# Patient Record
Sex: FEMALE | Race: BLACK OR AFRICAN AMERICAN | NOT HISPANIC OR LATINO | URBAN - METROPOLITAN AREA
[De-identification: names, ages, dates, MRNs, and addresses within clinical notes are randomized per-mention and may not be internally consistent; named-entity substitution may affect disease eponyms.]

---

## 2017-01-30 ENCOUNTER — EMERGENCY (EMERGENCY)
Facility: HOSPITAL | Age: 32
LOS: 1 days | Discharge: HOME | End: 2017-01-30
Attending: EMERGENCY MEDICINE | Admitting: EMERGENCY MEDICINE
Payer: MEDICAID

## 2017-01-30 VITALS
DIASTOLIC BLOOD PRESSURE: 76 MMHG | WEIGHT: 149.91 LBS | HEIGHT: 65 IN | SYSTOLIC BLOOD PRESSURE: 127 MMHG | HEART RATE: 93 BPM | RESPIRATION RATE: 16 BRPM | TEMPERATURE: 97 F | OXYGEN SATURATION: 100 %

## 2017-01-30 DIAGNOSIS — F41.8 OTHER SPECIFIED ANXIETY DISORDERS: ICD-10-CM

## 2017-01-30 DIAGNOSIS — F41.9 ANXIETY DISORDER, UNSPECIFIED: ICD-10-CM

## 2017-01-30 DIAGNOSIS — Z59.0 HOMELESSNESS: ICD-10-CM

## 2017-01-30 DIAGNOSIS — Z79.899 OTHER LONG TERM (CURRENT) DRUG THERAPY: ICD-10-CM

## 2017-01-30 PROCEDURE — 99285 EMERGENCY DEPT VISIT HI MDM: CPT

## 2017-01-30 PROCEDURE — 70450 CT HEAD/BRAIN W/O DYE: CPT | Mod: 26

## 2017-01-30 RX ORDER — ACETAMINOPHEN 500 MG
650 TABLET ORAL ONCE
Qty: 0 | Refills: 0 | Status: COMPLETED | OUTPATIENT
Start: 2017-01-30 | End: 2017-01-30

## 2017-01-30 RX ADMIN — Medication 650 MILLIGRAM(S): at 10:58

## 2017-01-30 SDOH — ECONOMIC STABILITY - HOUSING INSECURITY: HOMELESSNESS: Z59.0

## 2017-01-30 NOTE — ED ADULT NURSE REASSESSMENT NOTE - NS ED NURSE REASSESS COMMENT FT1
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

## 2017-01-30 NOTE — ED ADULT NURSE NOTE - CHPI ED SYMPTOMS NEG
no confusion/no syncope/no nausea/no weakness/no dizziness/no blurred vision/no vomiting/no change in level of consciousness

## 2017-01-30 NOTE — ED PROVIDER NOTE - PROGRESS NOTE DETAILS
paged social work Pt is stressed and tired of waiting for sw and psych.  Wants to leave now.  She is AAOx3 and continues to deny SI, HI or hallucinations. SW here to see pt in ED.  Pt amenable to see SW. Pt eloped. paged social work ( Scribe: Yesy Pastrana, scribing for and in the presence of Dr. Garcia )

## 2017-01-30 NOTE — ED PROVIDER NOTE - MEDICAL DECISION MAKING DETAILS
32yo F overwhelmed and feels stressed and depressed.  She is a single mom living in a shelter.  Pt hit herself with a hairdryer.  CT head, SW, psych and reassess.

## 2017-01-30 NOTE — ED PROVIDER NOTE - OBJECTIVE STATEMENT
32 y/o F presents to ED for evaluation of head injury. Pt states she hit herself in the head with a blow dryer yesterday afternoon because she is exhausted and tired. Pt states she feels like she has depression and would like to get help. Pt is a single mom and homeless, living in a shelter with a 10 year old daughter and 17 month old son.  Feels suicidal because of her living situation. Denies fever, NVD, LOC, or other injuries. JOSELIN- Carlos. 32 y/o F presents to ED for evaluation of head injury. Pt states she hit herself in the head with a blow dryer yesterday afternoon because she is exhausted and tired. Pt states she feels like she has depression and would like to get help. Pt is a single mom and homeless, living in a shelter with a 10 year old daughter and 17 month old son.   Support system includes her older brother and mother, though she does not always ask her for help.   Denies fever, NVD, LOC, or other injuries. PMD- Carlos. 32 y/o F presents to ED for evaluation of head injury. Pt states she hit herself in the head with a blow dryer yesterday afternoon because she is exhausted and tired. Pt states she feels like she has depression and would like to get help. Pt is a single mom and homeless, living in a shelter with a 10 year old daughter and 17 month old son.   Support system includes her older brother and mother, though she does not always ask them for help.   Denies fever, NVD, neck pain, LOC, or other injuries. PMANNALEE- Carlos. 32 y/o F presents to ED for evaluation of head injury. Pt states she hit herself in the head with a blow dryer yesterday afternoon because she is exhausted and tired. She  is a single mom and homeless, living in a shelter with a 10 year old daughter and 17 month old son.   Support system includes her older brother and mother, though she does not always ask them for help.  Pt states she feels like she has depression and would like to get help.  Denies fever, NVD, neck pain, LOC, or other injuries. PMD- Carlos. 32 y/o F presents to ED for evaluation of head injury. Pt states she hit herself in the head with a blow dryer yesterday afternoon because she is exhausted and tired. She  is a single mom and homeless, living in a shelter with a 10 year old daughter and 17 month old son.   Support system includes her older brother and mother, though she does not always ask them for help.  Pt states she feels like she has depression and would like to get help.  Denies fever, NVD, neck pain, LOC, or other injuries. Pt states she feels safe at home, she is in a homeless shelter.  She denies SI, HI, hallucinations.  Pt is overwhelmed being a single mom.  She states she gets free  for her 17mo old but in turn has to work for free so she is unable to obtain a job.  States mom is very condescending.  PMD- Carlos.

## 2017-01-30 NOTE — ED PROVIDER NOTE - CHPI ED SYMPTOMS NEG
no vomiting/no change in level of consciousness/no chest wall tenderness/no abrasion/no chest pain/no loss of consciousness/no back pain/no blurred vision/no weakness/no seizure

## 2017-01-30 NOTE — ED PROVIDER NOTE - ENMT, MLM
Airway patent, Nasal mucosa clear. Mouth with normal mucosa. Throat has no vesicles, no oropharyngeal exudates and uvula is midline. No c spine tenderness.

## 2019-08-01 ENCOUNTER — OUTPATIENT (OUTPATIENT)
Dept: OUTPATIENT SERVICES | Facility: HOSPITAL | Age: 34
LOS: 1 days | End: 2019-08-01
Payer: MEDICAID

## 2019-08-21 DIAGNOSIS — Z71.89 OTHER SPECIFIED COUNSELING: ICD-10-CM

## 2019-12-01 ENCOUNTER — OUTPATIENT (OUTPATIENT)
Dept: OUTPATIENT SERVICES | Facility: HOSPITAL | Age: 34
LOS: 1 days | End: 2019-12-01
Payer: MEDICAID

## 2019-12-01 PROCEDURE — G9005: CPT

## 2019-12-01 PROCEDURE — G9001: CPT

## 2020-01-01 ENCOUNTER — OUTPATIENT (OUTPATIENT)
Dept: OUTPATIENT SERVICES | Facility: HOSPITAL | Age: 35
LOS: 1 days | End: 2020-01-01
Payer: MEDICAID

## 2020-01-01 ENCOUNTER — EMERGENCY (EMERGENCY)
Facility: HOSPITAL | Age: 35
LOS: 1 days | Discharge: HOME | End: 2020-01-01
Attending: EMERGENCY MEDICINE | Admitting: EMERGENCY MEDICINE
Payer: MEDICAID

## 2020-01-01 VITALS
SYSTOLIC BLOOD PRESSURE: 124 MMHG | DIASTOLIC BLOOD PRESSURE: 78 MMHG | RESPIRATION RATE: 20 BRPM | TEMPERATURE: 98 F | HEART RATE: 90 BPM | OXYGEN SATURATION: 97 %

## 2020-01-01 VITALS
WEIGHT: 134.92 LBS | DIASTOLIC BLOOD PRESSURE: 77 MMHG | TEMPERATURE: 98 F | OXYGEN SATURATION: 99 % | RESPIRATION RATE: 18 BRPM | SYSTOLIC BLOOD PRESSURE: 119 MMHG | HEIGHT: 64 IN | HEART RATE: 87 BPM

## 2020-01-01 PROCEDURE — 99283 EMERGENCY DEPT VISIT LOW MDM: CPT

## 2020-01-01 NOTE — ED ADULT NURSE NOTE - NSIMPLEMENTINTERV_GEN_ALL_ED
Implemented All Universal Safety Interventions:  [place] to call system. Call bell, personal items and telephone within reach. Instruct patient to call for assistance. Room bathroom lighting operational. Non-slip footwear when patient is off stretcher. Physically safe environment: no spills, clutter or unnecessary equipment. Stretcher in lowest position, wheels locked, appropriate side rails in place.

## 2020-01-01 NOTE — ED PROVIDER NOTE - CHPI ED SYMPTOMS NEG
no disorientation/no hallucinations/no homicidal/no suicidal/no change in level of consciousness/no paranoia

## 2020-01-01 NOTE — ED PROVIDER NOTE - OBJECTIVE STATEMENT
Patient brought her son in for an asthma attack. Stated she wanted to register as a patient herself as she is very stressed, exhausted, and sleep deprived. States she has not slept in dyas, as she is taking care of her kids alone. Gets occasional help [place] but too far to provide regular help.    Patient denies S/H ideation. Does not know what she wants as a patient, but states she needs sleep, and right now needs "10 minutes to go smoke a cigarette."    Patient does not want to speak with a psychiatrist, though keeps stating that she's "going to have a nervous breakdown if she doesn't get some sleep." Offered patient to speak with SW to see if they can arrange help with the children to give patient a break and chance to rest. Patient just saying she needs a cigarette at this point

## 2020-01-01 NOTE — ED PROVIDER NOTE - CLINICAL SUMMARY MEDICAL DECISION MAKING FREE TEXT BOX
Patient with severe stress at home, not sleeping. Offered psych and SW. Will allow patient to go have a cigarette as she requests, then reassess patient's wants/ needs

## 2020-01-01 NOTE — ED PROVIDER NOTE - PATIENT PORTAL LINK FT
You can access the FollowMyHealth Patient Portal offered by [facility] by registering at the following website: [email or web address]. By joining [employer]’s FollowMyHealth portal, you will also be able to view your health information using other applications (apps) compatible with our system.

## 2020-01-01 NOTE — ED ADULT NURSE NOTE - OBJECTIVE STATEMENT
33yo female indicates "I just need some time to relax, I havent slept in four days" as per pt. pt denies any pain. pt is here with her son who is a patient.

## 2020-01-23 DIAGNOSIS — Z71.89 OTHER SPECIFIED COUNSELING: ICD-10-CM

## 2020-02-01 ENCOUNTER — OUTPATIENT (OUTPATIENT)
Dept: OUTPATIENT SERVICES | Facility: HOSPITAL | Age: 35
LOS: 1 days | End: 2020-02-01
Payer: MEDICAID

## 2020-02-01 PROCEDURE — G9005: CPT

## 2020-08-10 DIAGNOSIS — Z71.89 OTHER SPECIFIED COUNSELING: ICD-10-CM

## 2020-08-25 DIAGNOSIS — Z71.89 OTHER SPECIFIED COUNSELING: ICD-10-CM

## 2021-05-18 DIAGNOSIS — Z71.89 OTHER SPECIFIED COUNSELING: ICD-10-CM

## 2021-07-09 ENCOUNTER — EMERGENCY (EMERGENCY)
Facility: HOSPITAL | Age: 36
LOS: 1 days | Discharge: HOME | End: 2021-07-09
Attending: EMERGENCY MEDICINE | Admitting: EMERGENCY MEDICINE
Payer: MEDICAID

## 2021-07-09 VITALS
RESPIRATION RATE: 16 BRPM | HEART RATE: 86 BPM | TEMPERATURE: 99 F | SYSTOLIC BLOOD PRESSURE: 118 MMHG | DIASTOLIC BLOOD PRESSURE: 70 MMHG | OXYGEN SATURATION: 99 %

## 2021-07-09 VITALS
DIASTOLIC BLOOD PRESSURE: 66 MMHG | OXYGEN SATURATION: 99 % | HEIGHT: 64 IN | HEART RATE: 100 BPM | SYSTOLIC BLOOD PRESSURE: 124 MMHG | RESPIRATION RATE: 16 BRPM | TEMPERATURE: 99 F | WEIGHT: 145.06 LBS

## 2021-07-09 PROCEDURE — 12042 INTMD RPR N-HF/GENIT2.6-7.5: CPT

## 2021-07-09 PROCEDURE — 99284 EMERGENCY DEPT VISIT MOD MDM: CPT | Mod: 25

## 2021-07-09 PROCEDURE — 99284 EMERGENCY DEPT VISIT MOD MDM: CPT

## 2021-07-09 RX ORDER — OXYCODONE AND ACETAMINOPHEN 5; 325 MG/1; MG/1
1 TABLET ORAL ONCE
Refills: 0 | Status: DISCONTINUED | OUTPATIENT
Start: 2021-07-09 | End: 2021-07-09

## 2021-07-09 RX ADMIN — OXYCODONE AND ACETAMINOPHEN 1 TABLET(S): 5; 325 TABLET ORAL at 18:03

## 2021-07-09 NOTE — ED PROVIDER NOTE - OBJECTIVE STATEMENT
37 y/o F with no PMH presents to ED for c/o laceration to vagina. States got lac during Brazilian wax at 3:30pm. States hard wax was used. Denies any other pain or injury. Tried to stop bleeding with  gauze but continues to bleed. Not on blood thinners/

## 2021-07-09 NOTE — PROCEDURE NOTE - ADDITIONAL PROCEDURE DETAILS
Emergent procedure as pt was actively bleeding in ER.   Verbal consent for laceration repair given  5x3cm right labia minora laceration along skin fold.  Area cleansed with copious amount of saline and betadine  1% lidocaine with epi infiltrated into wound  Multiple deep and superficial sutures of 3-0 vicryl used to reapproximate wound  Good reapproximation and hemostasis  Remainder of vulva explored, no other lacerations noted

## 2021-07-09 NOTE — ED ADULT NURSE NOTE - OBJECTIVE STATEMENT
Amb to ED. Pt had a Brazilian wax this afternoon & she states it was no done correctly- "they pulled off the skin in my private area & it's painful & bleeding" O/E laceration noted to rt labia- bleeding controlled

## 2021-07-09 NOTE — ED PROVIDER NOTE - PROGRESS NOTE DETAILS
Call placed to GYN Dr Christie, will be in shortly to suture. Pt tolerated lac repair well by GYN. All discharge instructions were reviewed. Pt indicate understanding. Advised close follow up.

## 2021-07-09 NOTE — ED PROVIDER NOTE - ATTENDING CONTRIBUTION TO CARE
37 yo F p/w was getting a brazilian wax today using hard wax and some wax got into R side labia. When provider ripped the wax, it caused laceration to R labia. Pt then left and came right to ed. Pt co some persistent bleeding. No other inj or co.  Exam: MM Moist. neck supple. non-toxic, well appearing.   Exam chap by NP Knuppel - R inner labia with 1.5 cm laceration with some separation. No other acute findings.  GYN consulted for repair / eval.

## 2021-07-09 NOTE — ED ADULT NURSE NOTE - NSIMPLEMENTINTERV_GEN_ALL_ED
Implemented All Universal Safety Interventions:  [place] to call system. Call bell, personal items and telephone within reach. Instruct patient to call for assistance. Room bathroom lighting operational. Non-slip footwear when patient is off stretcher. Physically safe environment: no spills, clutter or unnecessary equipment. Stretcher in lowest position, wheels locked, appropriate side rails in place.

## 2021-07-09 NOTE — ED PROVIDER NOTE - NSFOLLOWUPINSTRUCTIONS_ED_ALL_ED_FT
1. TAKE ALL MEDICATIONS AS DIRECTED. REST APPLY ICE AS NEEDED. FOR PAIN YOU CAN TAKE IBUPROFEN (MOTRIN, ADVIL) OR ACETAMINOPHEN (TYLENOL) AS NEEDED, AS DIRECTED ON PACKAGING.  2. FOLLOW UP WITH ___OB/GYN_______ AS DIRECTED.  YOU WERE GIVEN COPIES OF ALL LABS AND IMAGING RESULTS FROM YOUR ER VISIT--PLEASE TAKE THEM WITH YOU TO YOUR APPOINTMENT.  3. IF NEEDED, CALL [phone] TO FIND A PRIMARY CARE PHYSICIAN.  OR CALL [phone] TO MAKE AN APPOINTMENT WITH THE MEDICAL CLINIC.  4. RETURN TO THE ER FOR ANY WORSENING SYMPTOMS.      Laceration    A laceration is a cut that goes through all of the layers of the skin and into the tissue that is right under the skin. Some lacerations heal on their own. Others need to be closed with skin adhesive strips, skin glue, stitches (sutures), or staples. Proper laceration care minimizes the risk of infection and helps the laceration to heal better.  If non-absorbable stitches or staples have been placed, they must be taken out within the time frame instructed by your healthcare provider.    SEEK IMMEDIATE MEDICAL CARE IF YOU HAVE ANY OF THE FOLLOWING SYMPTOMS: swelling around the wound, worsening pain, drainage from the wound, red streaking going away from your wound, inability to move finger or toe near the laceration, or discoloration of skin near the laceration. 1. TAKE ALL MEDICATIONS AS DIRECTED. REST APPLY ICE AS NEEDED. FOR PAIN YOU CAN TAKE IBUPROFEN (MOTRIN, ADVIL) OR ACETAMINOPHEN (TYLENOL) AS NEEDED, AS DIRECTED ON PACKAGING.  2. FOLLOW UP WITH ___OB/GYN_______ AS DIRECTED.  YOU WERE GIVEN COPIES OF ALL LABS AND IMAGING RESULTS FROM YOUR ER VISIT--PLEASE TAKE THEM WITH YOU TO YOUR APPOINTMENT.  3. IF NEEDED, CALL [phone] TO FIND A PRIMARY CARE PHYSICIAN.  OR CALL [phone] TO MAKE AN APPOINTMENT WITH THE MEDICAL CLINIC.  4. RETURN TO THE ER FOR ANY WORSENING SYMPTOMS.      NOTHING IN VAGIN X 4 WEEKS  APPLY ICE PACS AS NEEDED  NO SWIMMING NO BATHS NO TAMPONS NO SEX x 4 WEEKS  IF WORSENING PAIN SWELLING DRAINAGE FEVER CHILLS RETURN TO ED OR SEEK MEDICAL CARE     Laceration    A laceration is a cut that goes through all of the layers of the skin and into the tissue that is right under the skin. Some lacerations heal on their own. Others need to be closed with skin adhesive strips, skin glue, stitches (sutures), or staples. Proper laceration care minimizes the risk of infection and helps the laceration to heal better.  If non-absorbable stitches or staples have been placed, they must be taken out within the time frame instructed by your healthcare provider.    SEEK IMMEDIATE MEDICAL CARE IF YOU HAVE ANY OF THE FOLLOWING SYMPTOMS: swelling around the wound, worsening pain, drainage from the wound, red streaking going away from your wound, inability to move finger or toe near the laceration, or discoloration of skin near the laceration.

## 2021-07-09 NOTE — ED ADULT TRIAGE NOTE - CHIEF COMPLAINT QUOTE
" I went to get Brazilian wax and ripped my skin - I so much pain  " Pt took ibuprofen 1 tablet prior to arrival

## 2021-07-09 NOTE — CONSULT NOTE ADULT - ASSESSMENT
A/P:  37yo P2 with right labia minora laceration s/p traumatic Brazilian wax session  - Reapproximation of right labia minora with multiple deep and superficial sutures of 3-0 vicryl in an interrupted fashion with good reapproximation and hemostasis at end  - Perineal cold packs as needed  - May sparingly use aquaphor to area when using pad/pantiliner  - Spray bottle or pat dry area, do not rub or wipe  - Tylenol/Ibuprofen PRN  - No intercourse/douching/tampons for 4 weeks. Avoid strenuous activity, exercise, heavy lifting for two weeks. You may shower freely, but do not soak in the tub or swim.   - Follow-up with Gyn at [facility] in 1wk

## 2021-07-09 NOTE — CONSULT NOTE ADULT - SUBJECTIVE AND OBJECTIVE BOX
HPI: Pt is a 35yo P2 with PMH of Asthma who presented to ED with c/o vaginal bleeding s/p Brazilian wax at 3:30pm. Pt states that the waxing was abnormally painful and she asked the  to stop but she continued to proceed despite profuse bleeding. Pt discontinued the remainder of the session and drove herself to the ED for evaluation.       MEDICATIONS  (STANDING):    MEDICATIONS  (PRN):      Allergies    No Known Allergies    Intolerances        PAST MEDICAL & SURGICAL HISTORY:  Asthma    No significant past surgical history        OB/GYN HISTORY:  P2 -  x 2  Jaylene in 2021    FAMILY HISTORY:  Denies    SOCIAL HISTORY:  Social Alcohol, Marijuana    REVIEW OF SYSTEMS  As per HPI      Vital Signs Last 24 Hrs  T(C): 37.4 (2021 16:41), Max: 37.4 (2021 16:41)  T(F): 99.4 (2021 16:41), Max: 99.4 (2021 16:41)  HR: 100 (2021 16:41) (100 - 100)  BP: 124/66 (2021 16:41) (124/66 - 124/66)  BP(mean): --  RR: 16 (2021 16:41) (16 - 16)  SpO2: 99% (2021 16:41) (99% - 99%)    Last Menstrual Period      PHYSICAL EXAM:      Constitutional: AAOx3    Genitourinary: 5x3cm laceration along right labia minora crease, actively bleeding. No other vulvar lacerations or defects noted.    LABS:    I&O's Detail    RADIOLOGY & ADDITIONAL STUDIES:

## 2021-07-09 NOTE — ED PROVIDER NOTE - PATIENT PORTAL LINK FT
You can access the FollowMyHealth Patient Portal offered by [facility] by registering at the following website: [email or web address]. By joining [employer]’s FollowMyHealth portal, you will also be able to view your health information using other applications (apps) compatible with our system.

## 2021-12-23 ENCOUNTER — EMERGENCY (EMERGENCY)
Facility: HOSPITAL | Age: 36
LOS: 1 days | Discharge: HOME | End: 2021-12-23
Attending: EMERGENCY MEDICINE | Admitting: EMERGENCY MEDICINE
Payer: MEDICAID

## 2021-12-23 VITALS
HEIGHT: 64 IN | HEART RATE: 98 BPM | RESPIRATION RATE: 16 BRPM | SYSTOLIC BLOOD PRESSURE: 98 MMHG | OXYGEN SATURATION: 100 % | WEIGHT: 134.92 LBS | TEMPERATURE: 99 F | DIASTOLIC BLOOD PRESSURE: 64 MMHG

## 2021-12-23 VITALS
HEART RATE: 84 BPM | RESPIRATION RATE: 16 BRPM | OXYGEN SATURATION: 100 % | DIASTOLIC BLOOD PRESSURE: 75 MMHG | SYSTOLIC BLOOD PRESSURE: 112 MMHG

## 2021-12-23 LAB
ALBUMIN SERPL ELPH-MCNC: 3.6 G/DL — SIGNIFICANT CHANGE UP (ref 3.3–5)
ALP SERPL-CCNC: 40 U/L — SIGNIFICANT CHANGE UP (ref 30–120)
ALT FLD-CCNC: 22 U/L DA — SIGNIFICANT CHANGE UP (ref 10–60)
ANION GAP SERPL CALC-SCNC: 7 MMOL/L — SIGNIFICANT CHANGE UP (ref 5–17)
AST SERPL-CCNC: 15 U/L — SIGNIFICANT CHANGE UP (ref 10–40)
BASOPHILS # BLD AUTO: 0.05 K/UL — SIGNIFICANT CHANGE UP (ref 0–0.2)
BASOPHILS NFR BLD AUTO: 0.7 % — SIGNIFICANT CHANGE UP (ref 0–2)
BILIRUB SERPL-MCNC: 0.5 MG/DL — SIGNIFICANT CHANGE UP (ref 0.2–1.2)
BUN SERPL-MCNC: 18 MG/DL — SIGNIFICANT CHANGE UP (ref 7–23)
CALCIUM SERPL-MCNC: 8.4 MG/DL — SIGNIFICANT CHANGE UP (ref 8.4–10.5)
CHLORIDE SERPL-SCNC: 106 MMOL/L — SIGNIFICANT CHANGE UP (ref 96–108)
CO2 SERPL-SCNC: 25 MMOL/L — SIGNIFICANT CHANGE UP (ref 22–31)
CREAT SERPL-MCNC: 1.03 MG/DL — SIGNIFICANT CHANGE UP (ref 0.5–1.3)
EOSINOPHIL # BLD AUTO: 0.18 K/UL — SIGNIFICANT CHANGE UP (ref 0–0.5)
EOSINOPHIL NFR BLD AUTO: 2.5 % — SIGNIFICANT CHANGE UP (ref 0–6)
GLUCOSE SERPL-MCNC: 80 MG/DL — SIGNIFICANT CHANGE UP (ref 70–99)
HCG UR QL: NEGATIVE — SIGNIFICANT CHANGE UP
HCT VFR BLD CALC: 35.5 % — SIGNIFICANT CHANGE UP (ref 34.5–45)
HGB BLD-MCNC: 11.8 G/DL — SIGNIFICANT CHANGE UP (ref 11.5–15.5)
IMM GRANULOCYTES NFR BLD AUTO: 0.1 % — SIGNIFICANT CHANGE UP (ref 0–1.5)
LYMPHOCYTES # BLD AUTO: 2.77 K/UL — SIGNIFICANT CHANGE UP (ref 1–3.3)
LYMPHOCYTES # BLD AUTO: 37.9 % — SIGNIFICANT CHANGE UP (ref 13–44)
MCHC RBC-ENTMCNC: 31.9 PG — SIGNIFICANT CHANGE UP (ref 27–34)
MCHC RBC-ENTMCNC: 33.2 GM/DL — SIGNIFICANT CHANGE UP (ref 32–36)
MCV RBC AUTO: 95.9 FL — SIGNIFICANT CHANGE UP (ref 80–100)
MONOCYTES # BLD AUTO: 0.78 K/UL — SIGNIFICANT CHANGE UP (ref 0–0.9)
MONOCYTES NFR BLD AUTO: 10.7 % — SIGNIFICANT CHANGE UP (ref 2–14)
NEUTROPHILS # BLD AUTO: 3.52 K/UL — SIGNIFICANT CHANGE UP (ref 1.8–7.4)
NEUTROPHILS NFR BLD AUTO: 48.1 % — SIGNIFICANT CHANGE UP (ref 43–77)
NRBC # BLD: 0 /100 WBCS — SIGNIFICANT CHANGE UP (ref 0–0)
PLATELET # BLD AUTO: 238 K/UL — SIGNIFICANT CHANGE UP (ref 150–400)
POTASSIUM SERPL-MCNC: 4 MMOL/L — SIGNIFICANT CHANGE UP (ref 3.5–5.3)
POTASSIUM SERPL-SCNC: 4 MMOL/L — SIGNIFICANT CHANGE UP (ref 3.5–5.3)
PROT SERPL-MCNC: 6.8 G/DL — SIGNIFICANT CHANGE UP (ref 6–8.3)
RBC # BLD: 3.7 M/UL — LOW (ref 3.8–5.2)
RBC # FLD: 12.6 % — SIGNIFICANT CHANGE UP (ref 10.3–14.5)
SODIUM SERPL-SCNC: 138 MMOL/L — SIGNIFICANT CHANGE UP (ref 135–145)
TROPONIN I, HIGH SENSITIVITY RESULT: 4 NG/L — SIGNIFICANT CHANGE UP
WBC # BLD: 7.31 K/UL — SIGNIFICANT CHANGE UP (ref 3.8–10.5)
WBC # FLD AUTO: 7.31 K/UL — SIGNIFICANT CHANGE UP (ref 3.8–10.5)

## 2021-12-23 PROCEDURE — 93010 ELECTROCARDIOGRAM REPORT: CPT

## 2021-12-23 PROCEDURE — 80053 COMPREHEN METABOLIC PANEL: CPT

## 2021-12-23 PROCEDURE — 85025 COMPLETE CBC W/AUTO DIFF WBC: CPT

## 2021-12-23 PROCEDURE — 84484 ASSAY OF TROPONIN QUANT: CPT

## 2021-12-23 PROCEDURE — 71046 X-RAY EXAM CHEST 2 VIEWS: CPT | Mod: 26

## 2021-12-23 PROCEDURE — 99285 EMERGENCY DEPT VISIT HI MDM: CPT

## 2021-12-23 PROCEDURE — 36415 COLL VENOUS BLD VENIPUNCTURE: CPT

## 2021-12-23 PROCEDURE — 81025 URINE PREGNANCY TEST: CPT

## 2021-12-23 PROCEDURE — 93005 ELECTROCARDIOGRAM TRACING: CPT

## 2021-12-23 PROCEDURE — 71046 X-RAY EXAM CHEST 2 VIEWS: CPT

## 2021-12-23 PROCEDURE — 99283 EMERGENCY DEPT VISIT LOW MDM: CPT | Mod: 25

## 2021-12-23 NOTE — ED PROVIDER NOTE - CLINICAL SUMMARY MEDICAL DECISION MAKING FREE TEXT BOX
37 y/o F with pmhx of asthma presents with c/o palpitations x several months. States that she has had intermittent palpitations for months but more frequently over the past week. States that she notices the palpitations when she is upset with her kids. States that kids are home from school and due to holidays feels increased stress and anxiety and notices that her heart rate goes up when she is mad and upset. States that she occasionally has associated chest discomfort/tightness. Denies any chest pain or palpitations at this time. Denies fever, cough, calf pain/swelling, N/V, numbness, tingling, dizziness, diaphoresis, ocp use, hx of dvt/pe. PE: as above A/P; palpitations, anxiety; will get ekg, cxr, ddimer, troponin, labs

## 2021-12-23 NOTE — ED PROVIDER NOTE - ATTENDING CONTRIBUTION TO CARE
37 yo F p/w has been having some waxing / waning palpitations for past several months. More freq this past week. no cp/sob/palp. ,no cough/ uri. no recent covid exposures. no abd pain. no velez / easy fatigue. no numb/ting/focal weak. no agg/allev factors. no other acute co or changes.  exam: MM Moist. neck supple. no meningeal signs. cta bl, no w/r/r. nl cardiac exam. no mrg. abd soft NT. no hsm .no cvat. no c/c/.e. no calf tend. no other acute findings.  check labs, dimer, ekg, outpt cardio

## 2021-12-23 NOTE — ED PROVIDER NOTE - OBJECTIVE STATEMENT
37 y/o F with no pmhx 37 y/o F with no pmhx presents with c/o palpitations x several months. 37 y/o F with pmhx of asthma presents with c/o palpitations x several months. States that she has had intermittent palpitations for months but more frequently over the past week. States that she notices the palpitations when she is upset with her kids. States that kids are home from school and due to holidays feels increased stress and anxiety and notices that her heart rate goes up when she is mad and upset. States that she occasionally has associated chest discomfort/tightness. Denies any chest pain or palpitations at this time. Denies fever, cough, calf pain/swelling, N/V, numbness, tingling, dizziness, diaphoresis, ocp use, hx of dvt/pe.

## 2021-12-23 NOTE — ED PROVIDER NOTE - CARE PROVIDER_API CALL
Lincoln Eugene (MD)  Cardiovascular Disease; Internal Medicine  [address]  [place] [address]  Phone: [phone]  Fax: [phone]  Follow Up Time: 1-3 Days   Lincoln Eugene)  Cardiovascular Disease; Internal Medicine  [address]  [place] [address]  Phone: [phone]  Fax: [phone]  Follow Up Time: 1-3 Days    YOUR PMD,   Phone: (   )    -  Fax: (   )    -  Follow Up Time: 1-3 Days

## 2021-12-23 NOTE — ED PROVIDER NOTE - PROGRESS NOTE DETAILS
CXR reviewed, no acute findings. Will d/c home and f/u cardiologist. CXR reviewed, no acute findings. Will d/c home and f/u cardiologist and PCP.

## 2021-12-23 NOTE — ED PROVIDER NOTE - MUSCULOSKELETAL, MLM
Spine appears normal, range of motion is not limited, no muscle or joint tenderness, calf B NT without swelling

## 2021-12-23 NOTE — ED ADULT NURSE NOTE - OBJECTIVE STATEMENT
Pt came in for frequent episodes of palpitation , lightheadedness and chest pain x weeks now. Pt was seen and sent from an urgent care last night for further evaluation and management. Pt denies any fever, chills, vomiting , diarrhea slurred speech or facial drooping

## 2021-12-23 NOTE — ED PROVIDER NOTE - PATIENT PORTAL LINK FT
You can access the FollowMyHealth Patient Portal offered by [facility] by registering at the following website: [email or web address]. By joining "[employer]’s FollowMyHealth portal, you will also be able to view your health information using other applications (apps) compatible with our system.

## 2021-12-23 NOTE — ED PROVIDER NOTE - PROVIDER TOKENS
PROVIDER:[TOKEN:[[id number]],FOLLOWUP:[1-3 Days]] PROVIDER:[TOKEN:[[id number]],FOLLOWUP:[1-3 Days]],FREE:[LAST:[YOUR PMD],PHONE:[(   )    -],FAX:[(   )    -],FOLLOWUP:[1-3 Days]]

## 2022-01-13 ENCOUNTER — APPOINTMENT (OUTPATIENT)
Dept: PLASTIC SURGERY | Facility: CLINIC | Age: 37
End: 2022-01-13

## 2022-04-10 RX ORDER — ALBUTEROL 90 UG/1
0 AEROSOL, METERED ORAL
Qty: 0 | Refills: 0 | DISCHARGE

## 2022-06-30 NOTE — ED ADULT TRIAGE NOTE - BSA (M2)
Pt c/o burning rash to BLE since yesterday. No new foods, meds, products. No SOB. No throat tightness. No itching. 1.65

## 2022-10-20 NOTE — ED PROVIDER NOTE - CHIEF COMPLAINT
Message left at 3:20 pm        25year old patient left a message asking for a refill of zofran    Patient has eob visit on 11/7/2022     Patient was called back to confirm information and was advised of prescription printed when she went to the er     Patient found the prescription and will take to the pharmacy to get filled  Patient verbalized understanding
The patient is a 36y Female complaining of palpitations.

## 2022-11-04 NOTE — ED ADULT NURSE NOTE - TEMPLATE
Patient has not voided all night, bladder scanner read 508mL as the highest number. Patient said she felt like she could urinate, bedside RN is giving patient a couple minutes to try and void. Patient voided 200mL into the external cath.     Electronically signed by Nanci Chow RN on 11/4/2022 at 5:55 AM Head Injury

## 2023-06-28 ENCOUNTER — EMERGENCY (EMERGENCY)
Facility: HOSPITAL | Age: 38
LOS: 1 days | Discharge: LEFT BEFORE DISCHARGE | End: 2023-06-28
Admitting: EMERGENCY MEDICINE
Payer: MEDICAID

## 2023-06-28 VITALS
TEMPERATURE: 98 F | SYSTOLIC BLOOD PRESSURE: 116 MMHG | WEIGHT: 154.32 LBS | OXYGEN SATURATION: 100 % | HEIGHT: 64 IN | HEART RATE: 84 BPM | RESPIRATION RATE: 16 BRPM | DIASTOLIC BLOOD PRESSURE: 80 MMHG

## 2023-06-28 VITALS
RESPIRATION RATE: 19 BRPM | SYSTOLIC BLOOD PRESSURE: 121 MMHG | HEART RATE: 88 BPM | DIASTOLIC BLOOD PRESSURE: 70 MMHG | OXYGEN SATURATION: 98 %

## 2023-06-28 PROCEDURE — L9991: CPT

## 2023-06-28 NOTE — ED ADULT TRIAGE NOTE - CHIEF COMPLAINT QUOTE
" I have pain on right side shoulder for 8 months - worse when Im yelling keeps me from sleeping at night "

## 2023-06-28 NOTE — ED ADULT NURSE NOTE - OBJECTIVE STATEMENT
received pt in assigned room a&xo3 pt states she came to the ED  s/p near syncope at home, did not fall or hit her head, c/o chest pain & dizziness, pt also reports RUE pain/weakness, all s/s on going for a year on/off. EKG completed, pt placed on cardiac monitor,  pt requested labs to be drawn, during placement of IVL and lab draw , Pt became irate and began yelling at RN, saying " I don't want to be here" pt explained due to her complaint , labs would need to be drawn. offered care and explained she came to the ED for help, pt offered help, pt continued to yell at RN and other staff and refused to stay in the hospital. Pt ambulated to the bathroom and became aggressive and more uncooperative with all staff,  Code [place] activated for safety,  IVL removed and pt left ED without being seen by MD or completing treatment, left ED ambulating steady in nad

## 2023-06-28 NOTE — ED ADULT NURSE NOTE - NSFALLUNIVINTERV_ED_ALL_ED
Bed/Stretcher in lowest position, wheels locked, appropriate side rails in place/Call bell, personal items and telephone in reach/Instruct patient to call for assistance before getting out of bed/chair/stretcher/Non-slip footwear applied when patient is off stretcher/[place] to call system/Physically safe environment - no spills, clutter or unnecessary equipment/Purposeful proactive rounding/Room/bathroom lighting operational, light cord in reach

## 2023-06-29 PROBLEM — J45.909 UNSPECIFIED ASTHMA, UNCOMPLICATED: Chronic | Status: ACTIVE | Noted: 2020-01-01

## 2023-08-12 ENCOUNTER — NON-APPOINTMENT (OUTPATIENT)
Age: 38
End: 2023-08-12

## 2024-01-17 ENCOUNTER — NON-APPOINTMENT (OUTPATIENT)
Age: 39
End: 2024-01-17

## 2024-03-19 ENCOUNTER — EMERGENCY (EMERGENCY)
Facility: HOSPITAL | Age: 39
LOS: 1 days | Discharge: HOME | End: 2024-03-19
Attending: EMERGENCY MEDICINE | Admitting: EMERGENCY MEDICINE
Payer: MEDICAID

## 2024-03-19 VITALS
OXYGEN SATURATION: 100 % | TEMPERATURE: 98 F | WEIGHT: 154.98 LBS | DIASTOLIC BLOOD PRESSURE: 74 MMHG | HEART RATE: 94 BPM | SYSTOLIC BLOOD PRESSURE: 105 MMHG | RESPIRATION RATE: 16 BRPM | HEIGHT: 64 IN

## 2024-03-19 LAB — HCG UR QL: NEGATIVE — SIGNIFICANT CHANGE UP

## 2024-03-19 PROCEDURE — 73610 X-RAY EXAM OF ANKLE: CPT | Mod: 26,RT

## 2024-03-19 PROCEDURE — 99284 EMERGENCY DEPT VISIT MOD MDM: CPT

## 2024-03-19 PROCEDURE — 73610 X-RAY EXAM OF ANKLE: CPT

## 2024-03-19 PROCEDURE — 81025 URINE PREGNANCY TEST: CPT

## 2024-03-19 PROCEDURE — 73630 X-RAY EXAM OF FOOT: CPT

## 2024-03-19 PROCEDURE — 73630 X-RAY EXAM OF FOOT: CPT | Mod: 26,RT

## 2024-03-19 RX ORDER — IBUPROFEN 200 MG
600 TABLET ORAL ONCE
Refills: 0 | Status: COMPLETED | OUTPATIENT
Start: 2024-03-19 | End: 2024-03-19

## 2024-03-19 RX ADMIN — Medication 600 MILLIGRAM(S): at 15:29

## 2024-03-19 NOTE — ED PROVIDER NOTE - PHYSICAL EXAMINATION
ms right le mild lateral ankle tenderness. midfoot mild tenderness distally nontender from nvi no deformity

## 2024-03-19 NOTE — ED PROVIDER NOTE - PATIENT PORTAL LINK FT
You can access the FollowMyHealth Patient Portal offered by [facility] by registering at the following website: [email or web address]. By joining [employer]’s FollowMyHealth portal, you will also be able to view your health information using other applications (apps) compatible with our system.

## 2024-03-19 NOTE — ED PROVIDER NOTE - OBJECTIVE STATEMENT
Patient is a 39-year-old female with past medical history of asthma presents with right foot pain.  Patient reports she kicked something that she thought was some solid without issue yesterday causing acute onset of right foot and ankle pain.  Patient took over-the-counter medication which provided no relief.  Patient went to urgent care but they did not take her insurance so she came to ED for evaluation.  Patient denies fever numbness tingling. Patient is a 39-year-old female with past medical history of asthma presents with right foot pain.  Patient reports she kicked something solid yesterday causing acute onset of right foot and ankle pain.  Patient took over-the-counter medication which provided no relief.  Patient went to urgent care but they did not take her insurance so she came to ED for evaluation.  Patient denies fever numbness tingling.

## 2024-03-19 NOTE — ED PROVIDER NOTE - NSFOLLOWUPINSTRUCTIONS_ED_ALL_ED_FT
1. FOLLOW UP WITH YOUR PRIMARY DOCTOR IN 24-48 HOURS.   2. FOLLOW UP WITH ALL SPECIALIST DISCUSSED DURING YOUR VISIT.   3. TAKE ALL MEDICATIONS PRESCRIBED IN THE ER IF ANY ARE PRESCRIBED. CONTINUE YOUR HOME MEDICATIONS UNLESS OTHERWISE ADVISED DIFFERENTLY.   4. RETURN FOR WORSENING SYMPTOMS OR CONCERNS INCLUDING BUT NOT LIMITED TO FEVER, CHEST PAIN, OR TROUBLE BREATHING OR ANY OTHER CONCERNS  ibuprofen 600mg every 6 hours with food for pain  ice ankle and foot    Foot Pain  Many things can cause foot pain. Common causes include injuries to the foot. The injuries include sprains or broken bones, or injuries that affect the nerves in the feet. Other causes of foot pain include arthritis, blisters, and bunions.    To know what causes your foot pain, your health care provider will take a detailed history of your symptoms. They will also do a physical exam as well as imaging tests, such as X-ray or MRI.    Follow these instructions at home:  Managing pain, stiffness, and swelling    Bag of ice on a towel on the skin.  If told, put ice on the painful area.  Put ice in a plastic bag.  Place a towel between your skin and the bag.  Leave the ice on for 20 minutes, 2–3 times a day.  If your skin turns bright red, remove the ice right away to prevent skin damage. The risk of damage is higher if you cannot feel pain, heat, or cold.  Activity    Do not stand or walk for long periods.  Do stretches to relieve foot pain and stiffness as told by your provider.  Do not lift anything that is heavier than 10 lb (4.5 kg), or the limit that you are told, until your provider says that it is safe. Lifting a lot of weight can put added pressure on your feet.  Return to your normal activities as told by your provider. Ask your provider what activities are safe for you.  Lifestyle    Wear comfortable, supportive shoes that fit you well. Do not wear high heels.  Keep your feet clean and dry.  General instructions    Take over-the-counter and prescription medicines only as told by your provider.  Rub your foot gently.  Pay attention to any changes in your symptoms. Let your provider know if symptoms become worse.  Keep all follow-up visits. Your provider will want to monitor your progress.  Contact a health care provider if:  Your pain does not get better after a few days of treatment at home.  Your pain gets worse.  You cannot stand on your foot.  Your foot or toes are swollen.  Your foot is numb or tingling.  Get help right away if:  Your foot or toes turn white or blue.  You have warmth and redness along your foot.  This information is not intended to replace advice given to you by your health care provider. Make sure you discuss any questions you have with your health care provider.    Document Revised: 01/11/2024 Document Reviewed: 09/19/2023  [employer] Patient Education © 2024 [employer]  [employer] logo  Terms and Conditions  Privacy Policy  Editorial Policy  All content on this site: Copyright © 2024 [employer], its licensors, and contributors. All rights are reserved, including those for text and data mining, AI training, and similar technologies. For all open access content, the Creative Commons licensing terms apply.  Cookies are used by this site. To decline or learn more, visit our Cookies page.

## 2024-03-19 NOTE — ED ADULT NURSE NOTE - OBJECTIVE STATEMENT
Patient is a 39-year-old female with past medical history of asthma presents with right foot pain.  Patient reports she kicked something solid yesterday causing acute onset of right foot and ankle pain.  Patient took over-the-counter medication which provided no relief.  Patient went to urgent care but they did not take her insurance so she came to ED for evaluation.  Patient denies fever numbness tingling.

## 2024-03-19 NOTE — ED PROVIDER NOTE - PROGRESS NOTE DETAILS
advised podiatrist follow up nsaids given crutches advised on possible findings on final x ray read. nilsa schuster

## 2024-03-19 NOTE — ED ADULT NURSE NOTE - ALCOHOL PRE SCREEN (AUDIT - C)
Call to patient. Spoke with daughter, informed of information below, xray ordered and referral entered.   Statement Selected

## 2024-03-19 NOTE — ED PROVIDER NOTE - CLINICAL SUMMARY MEDICAL DECISION MAKING FREE TEXT BOX
Patient complaining of right foot pain status post injured last night when accidentally kicked a solid object.  Patient did not take anything for pain today.  Patient attempted to urgent care to get an x-ray however did not take her insurance so she came to the ER.    Plan x-ray right foot Motrin for pain    Differential including but not limited to fracture dislocation contusion

## 2024-03-19 NOTE — ED PROVIDER NOTE - CARE PROVIDER_API CALL
Anibal John-Fco  Foot Surgery  [address] [place] [id number]  Phone: [phone]  Fax: [phone]  Follow Up Time: 7-10 Days

## 2024-03-26 ENCOUNTER — NON-APPOINTMENT (OUTPATIENT)
Age: 39
End: 2024-03-26

## 2024-05-20 ENCOUNTER — NON-APPOINTMENT (OUTPATIENT)
Age: 39
End: 2024-05-20

## 2024-10-23 ENCOUNTER — EMERGENCY (EMERGENCY)
Facility: HOSPITAL | Age: 39
LOS: 1 days | Discharge: HOME | End: 2024-10-23
Attending: EMERGENCY MEDICINE | Admitting: EMERGENCY MEDICINE
Payer: MEDICAID

## 2024-10-23 VITALS
RESPIRATION RATE: 17 BRPM | HEART RATE: 92 BPM | DIASTOLIC BLOOD PRESSURE: 80 MMHG | TEMPERATURE: 98 F | OXYGEN SATURATION: 100 % | WEIGHT: 143.3 LBS | HEIGHT: 66 IN | SYSTOLIC BLOOD PRESSURE: 122 MMHG

## 2024-10-23 PROCEDURE — 99284 EMERGENCY DEPT VISIT MOD MDM: CPT

## 2024-10-23 PROCEDURE — 73130 X-RAY EXAM OF HAND: CPT

## 2024-10-23 PROCEDURE — 73590 X-RAY EXAM OF LOWER LEG: CPT

## 2024-10-23 PROCEDURE — 73590 X-RAY EXAM OF LOWER LEG: CPT | Mod: 26,50

## 2024-10-23 PROCEDURE — 73110 X-RAY EXAM OF WRIST: CPT | Mod: 26,RT

## 2024-10-23 PROCEDURE — 70450 CT HEAD/BRAIN W/O DYE: CPT | Mod: MC

## 2024-10-23 PROCEDURE — 73110 X-RAY EXAM OF WRIST: CPT

## 2024-10-23 PROCEDURE — 73562 X-RAY EXAM OF KNEE 3: CPT | Mod: 26,RT

## 2024-10-23 PROCEDURE — 72125 CT NECK SPINE W/O DYE: CPT | Mod: 26,MC

## 2024-10-23 PROCEDURE — 70450 CT HEAD/BRAIN W/O DYE: CPT | Mod: 26,MC

## 2024-10-23 PROCEDURE — 73562 X-RAY EXAM OF KNEE 3: CPT

## 2024-10-23 PROCEDURE — 70486 CT MAXILLOFACIAL W/O DYE: CPT | Mod: 26,MC

## 2024-10-23 PROCEDURE — 99284 EMERGENCY DEPT VISIT MOD MDM: CPT | Mod: 25

## 2024-10-23 PROCEDURE — 73130 X-RAY EXAM OF HAND: CPT | Mod: 26,RT

## 2024-10-23 PROCEDURE — 72125 CT NECK SPINE W/O DYE: CPT | Mod: MC

## 2024-10-23 PROCEDURE — 70486 CT MAXILLOFACIAL W/O DYE: CPT | Mod: MC

## 2024-10-23 RX ORDER — CYCLOBENZAPRINE HYDROCHLORIDE 15 MG/1
10 CAPSULE, EXTENDED RELEASE ORAL ONCE
Refills: 0 | Status: COMPLETED | OUTPATIENT
Start: 2024-10-23 | End: 2024-10-23

## 2024-10-23 RX ORDER — ACETAMINOPHEN 500 MG/5ML
975 LIQUID (ML) ORAL ONCE
Refills: 0 | Status: COMPLETED | OUTPATIENT
Start: 2024-10-23 | End: 2024-10-23

## 2024-10-23 RX ORDER — NAPROXEN SODIUM 275 MG
1 TABLET ORAL
Qty: 14 | Refills: 0
Start: 2024-10-23 | End: 2024-10-29

## 2024-10-23 RX ORDER — CYCLOBENZAPRINE HYDROCHLORIDE 15 MG/1
1 CAPSULE, EXTENDED RELEASE ORAL
Qty: 15 | Refills: 0
Start: 2024-10-23 | End: 2024-10-27

## 2024-10-23 RX ORDER — IBUPROFEN 200 MG
600 TABLET ORAL ONCE
Refills: 0 | Status: COMPLETED | OUTPATIENT
Start: 2024-10-23 | End: 2024-10-23

## 2024-10-23 RX ADMIN — CYCLOBENZAPRINE HYDROCHLORIDE 10 MILLIGRAM(S): 15 CAPSULE, EXTENDED RELEASE ORAL at 18:27

## 2024-10-23 RX ADMIN — Medication 975 MILLIGRAM(S): at 18:12

## 2024-10-23 RX ADMIN — Medication 600 MILLIGRAM(S): at 18:27

## 2025-05-08 ENCOUNTER — NON-APPOINTMENT (OUTPATIENT)
Age: 40
End: 2025-05-08